# Patient Record
Sex: FEMALE | Race: WHITE
[De-identification: names, ages, dates, MRNs, and addresses within clinical notes are randomized per-mention and may not be internally consistent; named-entity substitution may affect disease eponyms.]

---
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
## 2018-06-11 ENCOUNTER — HOSPITAL ENCOUNTER (EMERGENCY)
Dept: HOSPITAL 53 - M ED | Age: 18
Discharge: LEFT BEFORE DISCHARGE | End: 2018-06-11
Payer: COMMERCIAL

## 2018-06-11 DIAGNOSIS — Z53.29: Primary | ICD-10-CM

## 2018-06-11 LAB
LEUKOCYTE ESTERASE UR AUTO RFX: (no result)
SPECIFIC GRAVITY UR AUTO RFX: 1.01 (ref 1–1.03)
SQUAM EPITHELIAL CELL UR AURFX: 13 /HPF (ref 0–6)

## 2018-07-13 ENCOUNTER — HOSPITAL ENCOUNTER (EMERGENCY)
Dept: HOSPITAL 53 - M ED | Age: 18
Discharge: HOME | End: 2018-07-13
Payer: COMMERCIAL

## 2018-07-13 DIAGNOSIS — F17.200: ICD-10-CM

## 2018-07-13 DIAGNOSIS — J45.909: ICD-10-CM

## 2018-07-13 DIAGNOSIS — Z79.899: ICD-10-CM

## 2018-07-13 DIAGNOSIS — J06.9: Primary | ICD-10-CM

## 2018-07-13 DIAGNOSIS — Z88.0: ICD-10-CM

## 2018-07-13 DIAGNOSIS — R05: ICD-10-CM

## 2018-07-13 PROCEDURE — 94640 AIRWAY INHALATION TREATMENT: CPT

## 2018-07-13 RX ADMIN — ALBUTEROL SULFATE 1 MG: 2.5 SOLUTION RESPIRATORY (INHALATION) at 09:28

## 2018-08-01 ENCOUNTER — HOSPITAL ENCOUNTER (EMERGENCY)
Dept: HOSPITAL 53 - M ED | Age: 18
Discharge: HOME | End: 2018-08-01
Payer: COMMERCIAL

## 2018-08-01 DIAGNOSIS — N30.90: Primary | ICD-10-CM

## 2018-08-01 DIAGNOSIS — F17.210: ICD-10-CM

## 2018-08-01 DIAGNOSIS — Z97.5: ICD-10-CM

## 2018-08-01 DIAGNOSIS — J45.909: ICD-10-CM

## 2018-08-01 DIAGNOSIS — Z88.0: ICD-10-CM

## 2018-08-01 LAB
LEUKOCYTE ESTERASE UR AUTO RFX: (no result)
SPECIFIC GRAVITY UR AUTO RFX: 1.02 (ref 1–1.03)
SQUAM EPITHELIAL CELL UR AURFX: 35 /HPF (ref 0–6)

## 2018-08-01 PROCEDURE — 76856 US EXAM PELVIC COMPLETE: CPT

## 2019-08-12 ENCOUNTER — HOSPITAL ENCOUNTER (OUTPATIENT)
Dept: HOSPITAL 53 - M RAD | Age: 19
End: 2019-08-12
Attending: NURSE PRACTITIONER
Payer: COMMERCIAL

## 2019-08-12 DIAGNOSIS — Z86.018: Primary | ICD-10-CM

## 2019-08-12 NOTE — REP
Focused right breast sonography:

 

History:  Unspecified lump in the right breast. The exam order says "upper inner

quadrant" .  The patient reports that the palpable abnormality is superolateral

quadrant right breast.

 

Sonographic findings:  Right breast is scanned from 9 o'clock to 11 o'clock in

the patient stated area of the lump.  Heterogeneous fibroglandular background

echotexture is seen.  No cyst, mass, or acoustic shadowing is seen.

 

Impression:

 

BIRADS category one negative focused right breast sonography, upper outer

quadrant scanning as directed by the patient.  This should be correlated with the

patient's clinician breast exam.  Clinical follow-up is advised.

 

 

Electronically Signed by

Donald Aponte MD 08/12/2019 07:59 P

## 2020-03-01 ENCOUNTER — HOSPITAL ENCOUNTER (OUTPATIENT)
Dept: HOSPITAL 53 - M ED | Age: 20
LOS: 1 days | Discharge: HOME | End: 2020-03-02
Attending: SURGERY
Payer: SELF-PAY

## 2020-03-01 VITALS — SYSTOLIC BLOOD PRESSURE: 103 MMHG | DIASTOLIC BLOOD PRESSURE: 62 MMHG

## 2020-03-01 VITALS — SYSTOLIC BLOOD PRESSURE: 123 MMHG | DIASTOLIC BLOOD PRESSURE: 72 MMHG

## 2020-03-01 VITALS — DIASTOLIC BLOOD PRESSURE: 61 MMHG | SYSTOLIC BLOOD PRESSURE: 103 MMHG

## 2020-03-01 VITALS — HEIGHT: 61 IN | WEIGHT: 115.24 LBS | BODY MASS INDEX: 21.76 KG/M2

## 2020-03-01 VITALS — SYSTOLIC BLOOD PRESSURE: 117 MMHG | DIASTOLIC BLOOD PRESSURE: 77 MMHG

## 2020-03-01 VITALS — SYSTOLIC BLOOD PRESSURE: 103 MMHG | DIASTOLIC BLOOD PRESSURE: 65 MMHG

## 2020-03-01 DIAGNOSIS — K35.890: Primary | ICD-10-CM

## 2020-03-01 DIAGNOSIS — Z88.0: ICD-10-CM

## 2020-03-01 DIAGNOSIS — J45.909: ICD-10-CM

## 2020-03-01 DIAGNOSIS — F17.218: ICD-10-CM

## 2020-03-01 LAB
ALBUMIN SERPL BCG-MCNC: 4.3 GM/DL (ref 3.2–5.2)
ALT SERPL W P-5'-P-CCNC: 26 U/L (ref 12–78)
BASOPHILS # BLD AUTO: 0 10^3/UL (ref 0–0.2)
BASOPHILS NFR BLD AUTO: 0.3 % (ref 0–1)
BILIRUB CONJ SERPL-MCNC: 0.1 MG/DL (ref 0–0.2)
BILIRUB SERPL-MCNC: 0.4 MG/DL (ref 0.2–1)
EOSINOPHIL # BLD AUTO: 0.1 10^3/UL (ref 0–0.5)
EOSINOPHIL NFR BLD AUTO: 0.6 % (ref 0–3)
HCT VFR BLD AUTO: 46.3 % (ref 36–47)
HGB BLD-MCNC: 15.5 G/DL (ref 12–15.5)
LIPASE SERPL-CCNC: 101 U/L (ref 73–393)
LYMPHOCYTES # BLD AUTO: 2.7 10^3/UL (ref 1.5–5)
LYMPHOCYTES NFR BLD AUTO: 20.6 % (ref 24–44)
MCH RBC QN AUTO: 29.7 PG (ref 27–33)
MCHC RBC AUTO-ENTMCNC: 33.5 G/DL (ref 32–36.5)
MCV RBC AUTO: 88.7 FL (ref 80–96)
MONOCYTES # BLD AUTO: 0.7 10^3/UL (ref 0–0.8)
MONOCYTES NFR BLD AUTO: 4.9 % (ref 0–5)
NEUTROPHILS # BLD AUTO: 9.6 10^3/UL (ref 1.5–8.5)
NEUTROPHILS NFR BLD AUTO: 73.3 % (ref 36–66)
PLATELET # BLD AUTO: 315 10^3/UL (ref 150–450)
PROT SERPL-MCNC: 7.8 GM/DL (ref 6.4–8.2)
RBC # BLD AUTO: 5.22 10^6/UL (ref 4–5.4)
WBC # BLD AUTO: 13.2 10^3/UL (ref 4–10)

## 2020-03-01 PROCEDURE — 99284 EMERGENCY DEPT VISIT MOD MDM: CPT

## 2020-03-01 PROCEDURE — 83690 ASSAY OF LIPASE: CPT

## 2020-03-01 PROCEDURE — 74177 CT ABD & PELVIS W/CONTRAST: CPT

## 2020-03-01 PROCEDURE — 85025 COMPLETE CBC W/AUTO DIFF WBC: CPT

## 2020-03-01 PROCEDURE — 87086 URINE CULTURE/COLONY COUNT: CPT

## 2020-03-01 PROCEDURE — 83605 ASSAY OF LACTIC ACID: CPT

## 2020-03-01 PROCEDURE — 44970 LAPAROSCOPY APPENDECTOMY: CPT

## 2020-03-01 PROCEDURE — 88304 TISSUE EXAM BY PATHOLOGIST: CPT

## 2020-03-01 PROCEDURE — 80076 HEPATIC FUNCTION PANEL: CPT

## 2020-03-01 PROCEDURE — 96375 TX/PRO/DX INJ NEW DRUG ADDON: CPT

## 2020-03-01 PROCEDURE — 80047 BASIC METABLC PNL IONIZED CA: CPT

## 2020-03-01 PROCEDURE — 84702 CHORIONIC GONADOTROPIN TEST: CPT

## 2020-03-01 PROCEDURE — 81001 URINALYSIS AUTO W/SCOPE: CPT

## 2020-03-01 PROCEDURE — 96361 HYDRATE IV INFUSION ADD-ON: CPT

## 2020-03-01 PROCEDURE — 96365 THER/PROPH/DIAG IV INF INIT: CPT

## 2020-03-01 PROCEDURE — 96376 TX/PRO/DX INJ SAME DRUG ADON: CPT

## 2020-03-01 RX ADMIN — HYDROCODONE BITARTRATE AND ACETAMINOPHEN PRN TAB: 5; 325 TABLET ORAL at 23:15

## 2020-03-02 VITALS — SYSTOLIC BLOOD PRESSURE: 108 MMHG | DIASTOLIC BLOOD PRESSURE: 60 MMHG

## 2020-03-02 VITALS — DIASTOLIC BLOOD PRESSURE: 57 MMHG | SYSTOLIC BLOOD PRESSURE: 105 MMHG

## 2020-03-02 VITALS — DIASTOLIC BLOOD PRESSURE: 55 MMHG | SYSTOLIC BLOOD PRESSURE: 101 MMHG

## 2020-03-02 VITALS — DIASTOLIC BLOOD PRESSURE: 64 MMHG | SYSTOLIC BLOOD PRESSURE: 106 MMHG

## 2020-03-02 RX ADMIN — HYDROCODONE BITARTRATE AND ACETAMINOPHEN PRN TAB: 5; 325 TABLET ORAL at 05:16

## 2020-03-02 NOTE — REP
CT ABDOMEN AND PELVIS WITH CONTRAST:

 

TECHNIQUE:  Axial contrast enhanced images from the lung bases to the pubic

symphysis using 100 mL Isovue 370 intravenous contrast material with multiplanar

reformations.

 

Visualized lung bases are clear.  The liver, spleen, adrenals, pancreas and

kidneys are normal in appearance.  There is no hydronephrosis.  There is no

abdominal aortic aneurysm.  A couple of mildly prominent mesenteric lymph nodes

are seen in right lower quadrant mesentery.  No free air or fluid collection is

seen. The appendix is diffusely thickened with mucosal enhancement and

periappendiceal inflammatory stranding.  Findings are consistent with

appendicitis.  No pelvic mass is seen.  IUD is seen in the ureters.  Urinary

bladder is unremarkable.

 

IMPRESSION:

 

Findings compatible with appendicitis.  No free air or free fluid collection.

 

 

Electronically Signed by

King Kline MD 03/02/2020 12:03 P

## 2020-06-27 ENCOUNTER — HOSPITAL ENCOUNTER (EMERGENCY)
Dept: HOSPITAL 53 - M ED | Age: 20
Discharge: HOME | End: 2020-06-27
Payer: MEDICAID

## 2020-06-27 VITALS — SYSTOLIC BLOOD PRESSURE: 115 MMHG | DIASTOLIC BLOOD PRESSURE: 81 MMHG

## 2020-06-27 VITALS — HEIGHT: 61 IN | WEIGHT: 118.17 LBS | BODY MASS INDEX: 22.31 KG/M2

## 2020-06-27 DIAGNOSIS — F17.210: ICD-10-CM

## 2020-06-27 DIAGNOSIS — Z97.5: ICD-10-CM

## 2020-06-27 DIAGNOSIS — J45.909: ICD-10-CM

## 2020-06-27 DIAGNOSIS — N83.202: Primary | ICD-10-CM

## 2020-06-27 DIAGNOSIS — Z88.0: ICD-10-CM

## 2020-06-27 LAB
ALBUMIN SERPL BCG-MCNC: 3.9 GM/DL (ref 3.2–5.2)
ALT SERPL W P-5'-P-CCNC: 17 U/L (ref 12–78)
BASOPHILS # BLD AUTO: 0 10^3/UL (ref 0–0.2)
BASOPHILS NFR BLD AUTO: 0.6 % (ref 0–1)
BILIRUB CONJ SERPL-MCNC: 0.1 MG/DL (ref 0–0.2)
BILIRUB SERPL-MCNC: 0.4 MG/DL (ref 0.2–1)
EOSINOPHIL # BLD AUTO: 0.1 10^3/UL (ref 0–0.5)
EOSINOPHIL NFR BLD AUTO: 1.3 % (ref 0–3)
HCT VFR BLD AUTO: 43.6 % (ref 36–47)
HGB BLD-MCNC: 14.5 G/DL (ref 12–15.5)
LIPASE SERPL-CCNC: 97 U/L (ref 73–393)
LYMPHOCYTES # BLD AUTO: 2.3 10^3/UL (ref 1.5–5)
LYMPHOCYTES NFR BLD AUTO: 32.3 % (ref 24–44)
MCH RBC QN AUTO: 29.7 PG (ref 27–33)
MCHC RBC AUTO-ENTMCNC: 33.3 G/DL (ref 32–36.5)
MCV RBC AUTO: 89.2 FL (ref 80–96)
MONOCYTES # BLD AUTO: 0.5 10^3/UL (ref 0–0.8)
MONOCYTES NFR BLD AUTO: 6.4 % (ref 0–5)
NEUTROPHILS # BLD AUTO: 4.3 10^3/UL (ref 1.5–8.5)
NEUTROPHILS NFR BLD AUTO: 59.3 % (ref 36–66)
PLATELET # BLD AUTO: 241 10^3/UL (ref 150–450)
PROT SERPL-MCNC: 6.8 GM/DL (ref 6.4–8.2)
RBC # BLD AUTO: 4.89 10^6/UL (ref 4–5.4)
WBC # BLD AUTO: 7.2 10^3/UL (ref 4–10)

## 2020-06-27 PROCEDURE — 96375 TX/PRO/DX INJ NEW DRUG ADDON: CPT

## 2020-06-27 PROCEDURE — 85025 COMPLETE CBC W/AUTO DIFF WBC: CPT

## 2020-06-27 PROCEDURE — 83690 ASSAY OF LIPASE: CPT

## 2020-06-27 PROCEDURE — 81001 URINALYSIS AUTO W/SCOPE: CPT

## 2020-06-27 PROCEDURE — 99284 EMERGENCY DEPT VISIT MOD MDM: CPT

## 2020-06-27 PROCEDURE — 96374 THER/PROPH/DIAG INJ IV PUSH: CPT

## 2020-06-27 PROCEDURE — 74177 CT ABD & PELVIS W/CONTRAST: CPT

## 2020-06-27 PROCEDURE — 80076 HEPATIC FUNCTION PANEL: CPT

## 2020-06-27 PROCEDURE — 36415 COLL VENOUS BLD VENIPUNCTURE: CPT

## 2020-06-27 PROCEDURE — 84702 CHORIONIC GONADOTROPIN TEST: CPT

## 2020-06-27 PROCEDURE — 80047 BASIC METABLC PNL IONIZED CA: CPT

## 2020-06-27 NOTE — REP
Clinical:  Lower abdominal pain.  Recent appendectomy.

 

Technique:  Axial contrast enhanced images from the lung bases to the pubic

symphysis using 100 ml Isovue 370 intravenous contrast material with coronal and

sagittal re-formations.

 

Comparison:  03/01/2020.

 

Findings:

Lung bases are clear.  Visualized heart and pericardium normal.

 

Liver, spleen, pancreas, gallbladder, bilateral adrenal glands and kidneys are

normal.  The enteric system is without obstruction or acute inflammatory process.

Evidence for prior appendectomy noted.  No free air.  No inflammatory stranding.

No drainable collection/abscess.

 

Evaluation of the pelvis demonstrates normal bladder and age-appropriate uterus

with IUD in satisfactory position.  Small amount of free fluid in the pelvis and

left adnexal cyst likely related to menstrual cycle and physiologic changes.

 

Impression:

1.  Small amount of free fluid in the pelvis along with cystic change to the left

adnexa likely related to physiologic change and menstrual cycle, and possibly

related to patient's symptoms.

 

2.  Otherwise no acute A/P pathology appreciated.

 

 

Electronically Signed by

Demar De La Torre MD 06/27/2020 12:57 P

## 2020-07-16 ENCOUNTER — HOSPITAL ENCOUNTER (EMERGENCY)
Dept: HOSPITAL 53 - M ED | Age: 20
Discharge: HOME | End: 2020-07-16
Payer: COMMERCIAL

## 2020-07-16 VITALS — SYSTOLIC BLOOD PRESSURE: 124 MMHG | DIASTOLIC BLOOD PRESSURE: 73 MMHG

## 2020-07-16 VITALS — HEIGHT: 61 IN | WEIGHT: 113.1 LBS | BODY MASS INDEX: 21.35 KG/M2

## 2020-07-16 DIAGNOSIS — K04.7: Primary | ICD-10-CM

## 2020-07-16 DIAGNOSIS — Z88.0: ICD-10-CM

## 2020-07-16 PROCEDURE — 99283 EMERGENCY DEPT VISIT LOW MDM: CPT

## 2020-07-16 PROCEDURE — 96372 THER/PROPH/DIAG INJ SC/IM: CPT

## 2020-10-16 ENCOUNTER — HOSPITAL ENCOUNTER (OUTPATIENT)
Dept: HOSPITAL 53 - M LAB | Age: 20
End: 2020-10-16
Attending: PHYSICIAN ASSISTANT
Payer: MEDICAID

## 2020-10-16 DIAGNOSIS — N39.0: Primary | ICD-10-CM

## 2020-10-16 LAB
APPEARANCE UR: CLEAR
BACTERIA UR QL AUTO: (no result)
BILIRUB UR QL STRIP.AUTO: NEGATIVE
GLUCOSE UR QL STRIP.AUTO: NEGATIVE MG/DL
HGB UR QL STRIP.AUTO: NEGATIVE
KETONES UR QL STRIP.AUTO: NEGATIVE MG/DL
LEUKOCYTE ESTERASE UR QL STRIP.AUTO: (no result)
NITRITE UR QL STRIP.AUTO: NEGATIVE
PH UR STRIP.AUTO: 7 UNITS (ref 5–9)
PROT UR QL STRIP.AUTO: NEGATIVE MG/DL
RBC # UR AUTO: 0 /HPF (ref 0–3)
SP GR UR STRIP.AUTO: 1 (ref 1–1.03)
SQUAMOUS #/AREA URNS AUTO: 1 /HPF (ref 0–6)
UROBILINOGEN UR QL STRIP.AUTO: 0.2 MG/DL (ref 0–2)
WBC #/AREA URNS AUTO: 1 /HPF (ref 0–3)

## 2021-04-05 ENCOUNTER — HOSPITAL ENCOUNTER (OUTPATIENT)
Dept: HOSPITAL 53 - M SFHCWAGY | Age: 21
End: 2021-04-05
Attending: NURSE PRACTITIONER
Payer: COMMERCIAL

## 2021-04-05 DIAGNOSIS — Z11.3: Primary | ICD-10-CM

## 2021-04-09 LAB
CHLAMYDIA DNA AMPLIFICATION: NEGATIVE
N GONORRHOEA RRNA SPEC QL NAA+PROBE: NEGATIVE

## 2021-05-19 ENCOUNTER — HOSPITAL ENCOUNTER (EMERGENCY)
Dept: HOSPITAL 53 - M ED | Age: 21
Discharge: HOME | End: 2021-05-19
Payer: COMMERCIAL

## 2021-05-19 VITALS — HEIGHT: 61 IN | WEIGHT: 108.22 LBS | BODY MASS INDEX: 20.43 KG/M2

## 2021-05-19 VITALS — SYSTOLIC BLOOD PRESSURE: 117 MMHG | DIASTOLIC BLOOD PRESSURE: 68 MMHG

## 2021-05-19 DIAGNOSIS — J45.909: ICD-10-CM

## 2021-05-19 DIAGNOSIS — Z90.89: ICD-10-CM

## 2021-05-19 DIAGNOSIS — M54.6: Primary | ICD-10-CM

## 2021-05-19 DIAGNOSIS — Z88.0: ICD-10-CM

## 2021-11-10 ENCOUNTER — HOSPITAL ENCOUNTER (OUTPATIENT)
Dept: HOSPITAL 53 - M SFHCWAGY | Age: 21
End: 2021-11-10
Attending: ADVANCED PRACTICE MIDWIFE
Payer: COMMERCIAL

## 2021-11-10 DIAGNOSIS — Z34.91: Primary | ICD-10-CM

## 2021-11-10 LAB — N GONORRHOEA RRNA SPEC QL NAA+PROBE: NEGATIVE

## 2021-11-23 ENCOUNTER — HOSPITAL ENCOUNTER (OUTPATIENT)
Dept: HOSPITAL 53 - M WHC | Age: 21
End: 2021-11-23
Attending: ADVANCED PRACTICE MIDWIFE
Payer: COMMERCIAL

## 2021-11-23 DIAGNOSIS — Z34.81: Primary | ICD-10-CM

## 2021-11-23 NOTE — REP
INDICATION:

GROWTH



COMPARISON:

None.



TECHNIQUE:

Transabdominal obstetrical ultrasound with color Doppler evaluation.



FINDINGS:

Examination demonstrates a single live intrauterine pregnancy in variable

presentation.  Fetal motion is identified by technologist.  Placenta is noted anterior

and  grade 0 without evidence for placenta previa or abruption.  Amniotic fluid volume

is normal.  Cervix measures 3.8 cm in length and appears closed..



Gestational age by current measurements 14 weeks 1 day with TEVIN 05/23/2022.



FHR equals 143 beats per minute.



Estimated fetal weight 85th grams (17thpercentile).



Limited anatomical assessment demonstrates normal structures.



IMPRESSION:

Single live intrauterine pregnancy measuring at 14 weeks 1 day gestational age.

Complete anatomical assessment should be performed at 19-20 weeks.





<Electronically signed by Demar De La Torre > 11/23/21 [id number]

## 2022-02-22 ENCOUNTER — HOSPITAL ENCOUNTER (OUTPATIENT)
Dept: HOSPITAL 53 - M PLALAB | Age: 22
End: 2022-02-22
Attending: OBSTETRICS & GYNECOLOGY
Payer: COMMERCIAL

## 2022-02-22 ENCOUNTER — HOSPITAL ENCOUNTER (OUTPATIENT)
Dept: HOSPITAL 53 - M PLALAB | Age: 22
End: 2022-02-22
Attending: ADVANCED PRACTICE MIDWIFE
Payer: COMMERCIAL

## 2022-02-22 DIAGNOSIS — Z34.92: Primary | ICD-10-CM

## 2022-02-22 DIAGNOSIS — Z34.91: Primary | ICD-10-CM

## 2022-02-22 DIAGNOSIS — Z3A.16: ICD-10-CM

## 2022-02-22 DIAGNOSIS — Z36.89: Primary | ICD-10-CM

## 2022-02-22 LAB
HCT VFR BLD AUTO: 36.4 % (ref 36–47)
HGB BLD-MCNC: 12 G/DL (ref 12–15.5)
HIV 1+2 AB+HIV1 P24 AG SERPL QL IA: NEGATIVE
MCH RBC QN AUTO: 30.1 PG (ref 27–33)
MCHC RBC AUTO-ENTMCNC: 33 G/DL (ref 32–36.5)
MCV RBC AUTO: 91.2 FL (ref 80–96)
N GONORRHOEA RRNA SPEC QL NAA+PROBE: NEGATIVE
PLATELET # BLD AUTO: 298 10^3/UL (ref 150–450)
RBC # BLD AUTO: 3.99 10^6/UL (ref 4–5.4)
WBC # BLD AUTO: 16 10^3/UL (ref 4–10)

## 2022-05-19 ENCOUNTER — HOSPITAL ENCOUNTER (OUTPATIENT)
Dept: HOSPITAL 53 - M LDO | Age: 22
Discharge: HOME | End: 2022-05-19
Attending: OBSTETRICS & GYNECOLOGY
Payer: COMMERCIAL

## 2022-05-19 VITALS — DIASTOLIC BLOOD PRESSURE: 64 MMHG | SYSTOLIC BLOOD PRESSURE: 110 MMHG

## 2022-05-19 VITALS — BODY MASS INDEX: 28.76 KG/M2 | WEIGHT: 152.34 LBS | HEIGHT: 61 IN

## 2022-05-19 VITALS — SYSTOLIC BLOOD PRESSURE: 111 MMHG | DIASTOLIC BLOOD PRESSURE: 70 MMHG

## 2022-05-19 DIAGNOSIS — N89.8: ICD-10-CM

## 2022-05-19 DIAGNOSIS — O60.03: Primary | ICD-10-CM

## 2022-05-19 DIAGNOSIS — Z3A.39: ICD-10-CM

## 2022-05-19 DIAGNOSIS — O26.893: ICD-10-CM

## 2022-05-21 ENCOUNTER — HOSPITAL ENCOUNTER (INPATIENT)
Dept: HOSPITAL 53 - M LDO | Age: 22
LOS: 2 days | Discharge: HOME | DRG: 560 | End: 2022-05-23
Attending: OBSTETRICS & GYNECOLOGY | Admitting: OBSTETRICS & GYNECOLOGY
Payer: COMMERCIAL

## 2022-05-21 VITALS — DIASTOLIC BLOOD PRESSURE: 66 MMHG | SYSTOLIC BLOOD PRESSURE: 102 MMHG

## 2022-05-21 VITALS — SYSTOLIC BLOOD PRESSURE: 104 MMHG | DIASTOLIC BLOOD PRESSURE: 55 MMHG

## 2022-05-21 VITALS — DIASTOLIC BLOOD PRESSURE: 77 MMHG | SYSTOLIC BLOOD PRESSURE: 118 MMHG

## 2022-05-21 VITALS — DIASTOLIC BLOOD PRESSURE: 56 MMHG | SYSTOLIC BLOOD PRESSURE: 107 MMHG

## 2022-05-21 VITALS — SYSTOLIC BLOOD PRESSURE: 127 MMHG | DIASTOLIC BLOOD PRESSURE: 76 MMHG

## 2022-05-21 VITALS — HEIGHT: 61 IN | WEIGHT: 146.17 LBS | BODY MASS INDEX: 27.6 KG/M2

## 2022-05-21 VITALS — SYSTOLIC BLOOD PRESSURE: 115 MMHG | DIASTOLIC BLOOD PRESSURE: 75 MMHG

## 2022-05-21 VITALS — DIASTOLIC BLOOD PRESSURE: 73 MMHG | SYSTOLIC BLOOD PRESSURE: 121 MMHG

## 2022-05-21 VITALS — SYSTOLIC BLOOD PRESSURE: 111 MMHG | DIASTOLIC BLOOD PRESSURE: 64 MMHG

## 2022-05-21 VITALS — SYSTOLIC BLOOD PRESSURE: 116 MMHG | DIASTOLIC BLOOD PRESSURE: 72 MMHG

## 2022-05-21 VITALS — SYSTOLIC BLOOD PRESSURE: 95 MMHG | DIASTOLIC BLOOD PRESSURE: 54 MMHG

## 2022-05-21 VITALS — SYSTOLIC BLOOD PRESSURE: 99 MMHG | DIASTOLIC BLOOD PRESSURE: 62 MMHG

## 2022-05-21 VITALS — DIASTOLIC BLOOD PRESSURE: 68 MMHG | SYSTOLIC BLOOD PRESSURE: 112 MMHG

## 2022-05-21 VITALS — SYSTOLIC BLOOD PRESSURE: 100 MMHG | DIASTOLIC BLOOD PRESSURE: 50 MMHG

## 2022-05-21 VITALS — SYSTOLIC BLOOD PRESSURE: 110 MMHG | DIASTOLIC BLOOD PRESSURE: 71 MMHG

## 2022-05-21 VITALS — SYSTOLIC BLOOD PRESSURE: 110 MMHG | DIASTOLIC BLOOD PRESSURE: 66 MMHG

## 2022-05-21 VITALS — SYSTOLIC BLOOD PRESSURE: 120 MMHG | DIASTOLIC BLOOD PRESSURE: 71 MMHG

## 2022-05-21 DIAGNOSIS — O32.6XX0: Primary | ICD-10-CM

## 2022-05-21 DIAGNOSIS — Z3A.39: ICD-10-CM

## 2022-05-21 LAB
HCT VFR BLD AUTO: 35.6 % (ref 36–47)
HGB BLD-MCNC: 12.2 G/DL (ref 12–15.5)
MCH RBC QN AUTO: 30 PG (ref 27–33)
MCHC RBC AUTO-ENTMCNC: 34.3 G/DL (ref 32–36.5)
MCV RBC AUTO: 87.5 FL (ref 80–96)
PLATELET # BLD AUTO: 310 10^3/UL (ref 150–450)
RBC # BLD AUTO: 4.07 10^6/UL (ref 4–5.4)
WBC # BLD AUTO: 11.1 10^3/UL (ref 4–10)

## 2022-05-21 RX ADMIN — SODIUM CHLORIDE, POTASSIUM CHLORIDE, SODIUM LACTATE AND CALCIUM CHLORIDE SCH MLS/HR: 600; 310; 30; 20 INJECTION, SOLUTION INTRAVENOUS at 20:40

## 2022-05-22 VITALS — DIASTOLIC BLOOD PRESSURE: 80 MMHG | SYSTOLIC BLOOD PRESSURE: 124 MMHG

## 2022-05-22 VITALS — SYSTOLIC BLOOD PRESSURE: 112 MMHG | DIASTOLIC BLOOD PRESSURE: 77 MMHG

## 2022-05-22 VITALS — DIASTOLIC BLOOD PRESSURE: 65 MMHG | SYSTOLIC BLOOD PRESSURE: 116 MMHG

## 2022-05-22 VITALS — DIASTOLIC BLOOD PRESSURE: 73 MMHG | SYSTOLIC BLOOD PRESSURE: 117 MMHG

## 2022-05-22 VITALS — DIASTOLIC BLOOD PRESSURE: 66 MMHG | SYSTOLIC BLOOD PRESSURE: 121 MMHG

## 2022-05-22 VITALS — SYSTOLIC BLOOD PRESSURE: 129 MMHG | DIASTOLIC BLOOD PRESSURE: 82 MMHG

## 2022-05-22 VITALS — SYSTOLIC BLOOD PRESSURE: 118 MMHG | DIASTOLIC BLOOD PRESSURE: 68 MMHG

## 2022-05-22 VITALS — DIASTOLIC BLOOD PRESSURE: 74 MMHG | SYSTOLIC BLOOD PRESSURE: 120 MMHG

## 2022-05-22 VITALS — SYSTOLIC BLOOD PRESSURE: 116 MMHG | DIASTOLIC BLOOD PRESSURE: 74 MMHG

## 2022-05-22 VITALS — SYSTOLIC BLOOD PRESSURE: 121 MMHG | DIASTOLIC BLOOD PRESSURE: 74 MMHG

## 2022-05-22 VITALS — DIASTOLIC BLOOD PRESSURE: 68 MMHG | SYSTOLIC BLOOD PRESSURE: 110 MMHG

## 2022-05-22 VITALS — SYSTOLIC BLOOD PRESSURE: 134 MMHG | DIASTOLIC BLOOD PRESSURE: 87 MMHG

## 2022-05-22 LAB
ALBUMIN SERPL BCG-MCNC: 1.9 GM/DL (ref 3.2–5.2)
ALT SERPL W P-5'-P-CCNC: 29 U/L (ref 12–78)
APTT BLD: 32 SECONDS (ref 25.9–37)
BILIRUB SERPL-MCNC: 0.4 MG/DL (ref 0.2–1)
BUN SERPL-MCNC: 5 MG/DL (ref 7–18)
CALCIUM SERPL-MCNC: 8.1 MG/DL (ref 8.5–10.1)
CHLORIDE SERPL-SCNC: 111 MEQ/L (ref 98–107)
CO2 SERPL-SCNC: 24 MEQ/L (ref 21–32)
CREAT SERPL-MCNC: 0.74 MG/DL (ref 0.55–1.3)
FIBRINOGEN PPP-MCNC: 377 MG/DL (ref 268–480)
GFR SERPL CREATININE-BSD FRML MDRD: > 60 ML/MIN/{1.73_M2} (ref 60–?)
GLUCOSE SERPL-MCNC: 100 MG/DL (ref 70–100)
HCT VFR BLD AUTO: 29.3 % (ref 36–47)
HCT VFR BLD AUTO: 31.1 % (ref 36–47)
HGB BLD-MCNC: 10.9 G/DL (ref 12–15.5)
HGB BLD-MCNC: 9.9 G/DL (ref 12–15.5)
INR PPP: 0.98
MCH RBC QN AUTO: 30.4 PG (ref 27–33)
MCH RBC QN AUTO: 30.6 PG (ref 27–33)
MCHC RBC AUTO-ENTMCNC: 33.8 G/DL (ref 32–36.5)
MCHC RBC AUTO-ENTMCNC: 35 G/DL (ref 32–36.5)
MCV RBC AUTO: 86.9 FL (ref 80–96)
MCV RBC AUTO: 90.4 FL (ref 80–96)
PLATELET # BLD AUTO: 226 10^3/UL (ref 150–450)
PLATELET # BLD AUTO: 260 10^3/UL (ref 150–450)
POTASSIUM SERPL-SCNC: 3.4 MEQ/L (ref 3.5–5.1)
PROT SERPL-MCNC: 4.6 GM/DL (ref 6.4–8.2)
PROTHROMBIN TIME: 13.4 SECONDS (ref 12.7–14.5)
RBC # BLD AUTO: 3.24 10^6/UL (ref 4–5.4)
RBC # BLD AUTO: 3.58 10^6/UL (ref 4–5.4)
SODIUM SERPL-SCNC: 142 MEQ/L (ref 136–145)
WBC # BLD AUTO: 12.2 10^3/UL (ref 4–10)
WBC # BLD AUTO: 13.3 10^3/UL (ref 4–10)

## 2022-05-22 PROCEDURE — 30233N1 TRANSFUSION OF NONAUTOLOGOUS RED BLOOD CELLS INTO PERIPHERAL VEIN, PERCUTANEOUS APPROACH: ICD-10-PCS | Performed by: OBSTETRICS & GYNECOLOGY

## 2022-05-22 RX ADMIN — SODIUM CHLORIDE, POTASSIUM CHLORIDE, SODIUM LACTATE AND CALCIUM CHLORIDE SCH MLS/HR: 600; 310; 30; 20 INJECTION, SOLUTION INTRAVENOUS at 04:40

## 2022-05-22 RX ADMIN — Medication SCH TAB: at 09:34

## 2022-05-23 VITALS — SYSTOLIC BLOOD PRESSURE: 134 MMHG | DIASTOLIC BLOOD PRESSURE: 79 MMHG

## 2022-05-23 VITALS — DIASTOLIC BLOOD PRESSURE: 72 MMHG | SYSTOLIC BLOOD PRESSURE: 109 MMHG

## 2022-05-23 RX ADMIN — Medication SCH TAB: at 08:27

## 2022-08-14 ENCOUNTER — HOSPITAL ENCOUNTER (OUTPATIENT)
Dept: HOSPITAL 53 - M LABSMTC | Age: 22
End: 2022-08-14
Attending: ANESTHESIOLOGY
Payer: COMMERCIAL

## 2022-08-14 DIAGNOSIS — Z01.812: Primary | ICD-10-CM

## 2022-08-19 ENCOUNTER — HOSPITAL ENCOUNTER (OUTPATIENT)
Dept: HOSPITAL 53 - M SDC | Age: 22
Discharge: HOME | End: 2022-08-19
Attending: OBSTETRICS & GYNECOLOGY
Payer: COMMERCIAL

## 2022-08-19 VITALS — DIASTOLIC BLOOD PRESSURE: 66 MMHG | SYSTOLIC BLOOD PRESSURE: 119 MMHG

## 2022-08-19 VITALS — BODY MASS INDEX: 25 KG/M2 | WEIGHT: 132.4 LBS | HEIGHT: 61 IN

## 2022-08-19 DIAGNOSIS — F17.210: ICD-10-CM

## 2022-08-19 DIAGNOSIS — Z30.2: Primary | ICD-10-CM

## 2022-08-19 DIAGNOSIS — Z88.0: ICD-10-CM

## 2022-08-19 DIAGNOSIS — Z90.89: ICD-10-CM

## 2022-08-19 LAB
HCT VFR BLD AUTO: 42.4 % (ref 36–47)
HGB BLD-MCNC: 13.7 G/DL (ref 12–15.5)
MCH RBC QN AUTO: 28.2 PG (ref 27–33)
MCHC RBC AUTO-ENTMCNC: 32.3 G/DL (ref 32–36.5)
MCV RBC AUTO: 87.4 FL (ref 80–96)
PLATELET # BLD AUTO: 301 10^3/UL (ref 150–450)
RBC # BLD AUTO: 4.85 10^6/UL (ref 4–5.4)
WBC # BLD AUTO: 7.8 10^3/UL (ref 4–10)

## 2022-08-19 PROCEDURE — 86850 RBC ANTIBODY SCREEN: CPT

## 2022-08-19 PROCEDURE — 85027 COMPLETE CBC AUTOMATED: CPT

## 2022-08-19 PROCEDURE — 86901 BLOOD TYPING SEROLOGIC RH(D): CPT

## 2022-08-19 PROCEDURE — 88302 TISSUE EXAM BY PATHOLOGIST: CPT

## 2022-08-19 PROCEDURE — 86900 BLOOD TYPING SEROLOGIC ABO: CPT

## 2022-08-19 PROCEDURE — 36415 COLL VENOUS BLD VENIPUNCTURE: CPT

## 2022-08-19 PROCEDURE — 58661 LAPAROSCOPY REMOVE ADNEXA: CPT

## 2022-08-19 PROCEDURE — 81025 URINE PREGNANCY TEST: CPT

## 2022-08-19 RX ADMIN — MORPHINE SULFATE PRN MG: 2 INJECTION, SOLUTION INTRAMUSCULAR; INTRAVENOUS at 11:17

## 2022-08-19 RX ADMIN — MORPHINE SULFATE PRN MG: 2 INJECTION, SOLUTION INTRAMUSCULAR; INTRAVENOUS at 11:22

## 2023-03-27 ENCOUNTER — HOSPITAL ENCOUNTER (EMERGENCY)
Dept: HOSPITAL 53 - M ED | Age: 23
LOS: 1 days | Discharge: LEFT BEFORE DISCHARGE | End: 2023-03-28
Payer: COMMERCIAL

## 2023-03-27 VITALS — BODY MASS INDEX: 24.68 KG/M2 | HEIGHT: 61 IN | WEIGHT: 130.73 LBS

## 2023-03-27 DIAGNOSIS — Z53.21: Primary | ICD-10-CM

## 2023-03-28 VITALS — DIASTOLIC BLOOD PRESSURE: 74 MMHG | SYSTOLIC BLOOD PRESSURE: 120 MMHG

## 2024-01-15 ENCOUNTER — HOSPITAL ENCOUNTER (OUTPATIENT)
Dept: HOSPITAL 53 - M EMP | Age: 24
End: 2024-01-15
Attending: FAMILY MEDICINE

## 2024-01-15 DIAGNOSIS — Z53.9: Primary | ICD-10-CM

## 2024-03-28 ENCOUNTER — HOSPITAL ENCOUNTER (EMERGENCY)
Dept: HOSPITAL 53 - M ED | Age: 24
Discharge: HOME | End: 2024-03-28
Payer: COMMERCIAL

## 2024-03-28 VITALS — SYSTOLIC BLOOD PRESSURE: 119 MMHG | DIASTOLIC BLOOD PRESSURE: 70 MMHG | TEMPERATURE: 97.8 F | OXYGEN SATURATION: 99 %

## 2024-03-28 VITALS — BODY MASS INDEX: 24.1 KG/M2 | HEIGHT: 61 IN | WEIGHT: 127.65 LBS
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
## 2024-03-28 DIAGNOSIS — S06.0X0A: Primary | ICD-10-CM

## 2024-03-28 DIAGNOSIS — Z88.1: ICD-10-CM
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
## 2024-03-28 DIAGNOSIS — J45.909: ICD-10-CM
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
## 2024-03-28 DIAGNOSIS — Z79.52: ICD-10-CM

## 2024-04-10 ENCOUNTER — HOSPITAL ENCOUNTER (OUTPATIENT)
Dept: HOSPITAL 53 - M SFHCWAGY | Age: 24
End: 2024-04-10
Payer: COMMERCIAL

## 2024-04-10 DIAGNOSIS — N94.10: ICD-10-CM

## 2024-04-10 DIAGNOSIS — R10.2: Primary | ICD-10-CM

## 2024-06-24 ENCOUNTER — HOSPITAL ENCOUNTER (OUTPATIENT)
Dept: HOSPITAL 53 - M WHC | Age: 24
End: 2024-06-24
Payer: COMMERCIAL

## 2024-06-24 DIAGNOSIS — R10.2: Primary | ICD-10-CM

## 2024-06-24 DIAGNOSIS — N94.10: ICD-10-CM
